# Patient Record
Sex: MALE | URBAN - METROPOLITAN AREA
[De-identification: names, ages, dates, MRNs, and addresses within clinical notes are randomized per-mention and may not be internally consistent; named-entity substitution may affect disease eponyms.]

---

## 2024-08-01 ENCOUNTER — TELEPHONE (OUTPATIENT)
Age: 54
End: 2024-08-01

## 2024-08-01 NOTE — TELEPHONE ENCOUNTER
We have received [facility] authorization, [id number], for pain mgmt. This referral has been returned to [facility]. This referral is outside of our specialty and cannot be scheduled.  needs to be referred to a more appropriate provider. Thank you.